# Patient Record
Sex: MALE | Race: WHITE | Employment: OTHER | URBAN - NONMETROPOLITAN AREA
[De-identification: names, ages, dates, MRNs, and addresses within clinical notes are randomized per-mention and may not be internally consistent; named-entity substitution may affect disease eponyms.]

---

## 2024-07-15 ENCOUNTER — HOSPITAL ENCOUNTER (OUTPATIENT)
Dept: SPEECH THERAPY | Age: 69
Setting detail: THERAPIES SERIES
Discharge: HOME | End: 2024-07-15
Payer: MEDICARE

## 2024-07-15 PROCEDURE — 92524 BEHAVRAL QUALIT ANALYS VOICE: CPT

## 2024-07-15 NOTE — PROGRESS NOTES
injection. Patient states his voice has improved since starting treatments.     Patient presents with mild dysphonia. Patient's voice can be described as [id number] on the GRABAS scale. Patient presents with reduced breath support for phonation.He was able to sustain phonation for average of 7.96 seconds.( Average for similar aged peers is 17.02s). Clavicular and Thoracic breathing were observed throughout session. Patient's demonstrated frequent pitch breaks with sustained phonation. Patient also demonstrated reduced pitch range when completing pitch glides, especially with higher pitches.    Voice treatment strategies were trialed during evaluation. ST had patient trial laryngeal massage, semi-occluded vocal tract exercises (straw phonation and bubbles in cup phonation),resonance exercises, and yawn-sigh exercise. Patient required moderate-maximal verbal and visual cues to complete SOVTE. Patient demonstrated improved vocal quality with use of yawn-sigh and resonance exercises to produce more forward voice with less laryngeal strain. Patient also responsive to education re: diaphragmatic breathing techniques.     Patient would benefit from speech therapy to improve vocal function and to prevent vocal fold scar from developing.     Treatment/Goals  Short Term Goals: Completed by  90 days from this evaluation date       Short-term Goal(s):   Goal 1: Patient will establish volitional control of respiration evidenced by utilization of abdominal breathing during structured tasks with greater than 90% accuracy independently.    Goal 2: Patient will complete Semi-occluded vocal tract exercises and resonance voice exercises to improve vocal quality and prevent vocal scar 10x each with min A.    Goal 3: Patient will report compliance with HEP     Long Term Goals:   1. Patient will achieve improved/normal voice as assessed with perceptual measures or patient reported outcome measures.     Patient tolerated today’s

## 2024-07-24 ENCOUNTER — HOSPITAL ENCOUNTER (OUTPATIENT)
Dept: SPEECH THERAPY | Age: 69
Setting detail: THERAPIES SERIES
Discharge: HOME | End: 2024-07-24
Payer: MEDICARE

## 2024-07-24 PROCEDURE — 92507 TX SP LANG VOICE COMM INDIV: CPT

## 2024-07-24 NOTE — PROGRESS NOTES
Phone: [phone]                        [facility]    Fax: [phone]                                 Outpatient Speech Therapy                               DAILY TREATMENT NOTE    Date: 7/24/2024  Patient’s Name:  Nikko Aguirre  YOB: 1955 (69 y.o.)  Gender:  male  MRN:  [id number]  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 No other changes or concerns. He reports that he will see the ENT next week for re-assessment.      Goal 1: Patient will establish volitional control of respiration evidenced by utilization of abdominal breathing during structured tasks with greater than 90% accuracy independently.     Patient completed sustained inhalation/sustained exhalation exercises x 10 given mod A. Provided handout for breath support exercises.       []Met  []Partially met  []Not met   Goal 2: Patient will complete Semi-occluded vocal tract exercises and resonance voice exercises to improve vocal quality and prevent vocal scar 10x each with min A.       Completed SOVTE via straw phonation with sustained phonation, pitch glides, and hills and valleys with good phonation x 4-6 each.     Improved vocal quality noted with SOVTE.    Patient does demonstrate increased strain and gravely voice in conversational tasks. Patient does state \"that's just the way I talk.\"      Patient produced resonant exercises with sustained /mmm/ and

## 2024-08-01 ENCOUNTER — HOSPITAL ENCOUNTER (OUTPATIENT)
Dept: SPEECH THERAPY | Age: 69
Setting detail: THERAPIES SERIES
Discharge: HOME | End: 2024-08-01

## 2024-08-01 NOTE — PROGRESS NOTES
[facility]  Cancel Note/ No Show Note    Date: 2024  Patient Name: Nikko Aguirre        MRN: [id number]    Account #: [id number]  : 1955  (69 y.o.)  Gender: male                REASON FOR MISSED TREATMENT:    []Cancelled due to illness.  [] Therapist Cancelled Appointment  []Cancelled due to other appointment   []No Show / No call.  Pt called with next scheduled appointment.  [] Cancelled due to transportation conflict  []Cancelled due to weather  []Frequency of order changed  []Patient on hold due to:     [x]OTHER:  Patient stated that he couldn't make it.       Electronically signed by:    Genevieve Hawkins M.S., CCC-SLP              Date:2024

## 2024-08-08 ENCOUNTER — HOSPITAL ENCOUNTER (OUTPATIENT)
Dept: SPEECH THERAPY | Age: 69
Setting detail: THERAPIES SERIES
Discharge: HOME | End: 2024-08-08
Payer: MEDICARE

## 2024-08-08 PROCEDURE — 92507 TX SP LANG VOICE COMM INDIV: CPT

## 2024-08-08 NOTE — PROGRESS NOTES
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 He feels that his voice is close to baseline. Patient states that ENT reports that his vocal cords are healing and the ulcers are gradually getting smaller. He does report that his voice is usually worse in the morning.    Patient returns to ENT September 3rd. Patient would like to wait until after his follow up for further treatment and will continue his exercises at home.     ST had patient complete Voice Handicap Index again.    The Voice Handicap Index was administered in order to determine self-perceived severity of voice symptoms. The patient achieved the following scores.    Functional Subscale: 0  Physical Subscale:0  Emotional Subscale: 0    Total Score: 0    Score Range Severity   0-30  Mild   31-60 Moderate     Severe     Previous Score: 9           Goal 1: Patient will establish volitional control of respiration evidenced by utilization of abdominal breathing during structured tasks with greater than 90% accuracy independently.     Patient completed sustained inhalation/sustained